# Patient Record
Sex: FEMALE | Race: WHITE
[De-identification: names, ages, dates, MRNs, and addresses within clinical notes are randomized per-mention and may not be internally consistent; named-entity substitution may affect disease eponyms.]

---

## 2018-09-26 ENCOUNTER — HOSPITAL ENCOUNTER (EMERGENCY)
Dept: HOSPITAL 33 - ED | Age: 28
Discharge: HOME | End: 2018-09-26
Payer: COMMERCIAL

## 2018-09-26 VITALS — DIASTOLIC BLOOD PRESSURE: 77 MMHG | SYSTOLIC BLOOD PRESSURE: 119 MMHG

## 2018-09-26 VITALS — OXYGEN SATURATION: 97 %

## 2018-09-26 VITALS — HEART RATE: 74 BPM

## 2018-09-26 DIAGNOSIS — M54.9: ICD-10-CM

## 2018-09-26 DIAGNOSIS — M54.2: ICD-10-CM

## 2018-09-26 DIAGNOSIS — R51: Primary | ICD-10-CM

## 2018-09-26 DIAGNOSIS — R53.1: ICD-10-CM

## 2018-09-26 DIAGNOSIS — Z79.899: ICD-10-CM

## 2018-09-26 LAB
ALBUMIN SERPL-MCNC: 5.1 G/DL (ref 3.5–5)
ALP SERPL-CCNC: 75 U/L (ref 38–126)
ALT SERPL-CCNC: 17 U/L (ref 0–35)
AMPHETAMINES UR QL: NEGATIVE
ANION GAP SERPL CALC-SCNC: 18.5 MEQ/L (ref 5–15)
AST SERPL QL: 22 U/L (ref 14–36)
BARBITURATES UR QL: NEGATIVE
BASOPHILS # BLD AUTO: 0.01 10*3/UL (ref 0–0.4)
BASOPHILS NFR BLD AUTO: 0.1 % (ref 0–0.4)
BENZODIAZ UR QL SCN: NEGATIVE
BILIRUB BLD-MCNC: 0.6 MG/DL (ref 0.2–1.3)
BUN SERPL-MCNC: 10 MG/DL (ref 7–17)
CALCIUM SPEC-MCNC: 9.8 MG/DL (ref 8.4–10.2)
CHLORIDE SERPL-SCNC: 100 MMOL/L (ref 98–107)
CO2 SERPL-SCNC: 26 MMOL/L (ref 22–30)
COCAINE UR QL SCN: NEGATIVE
CREAT SERPL-MCNC: 0.95 MG/DL (ref 0.52–1.04)
EOSINOPHIL # BLD AUTO: 0 10*3/UL (ref 0–0.5)
GLUCOSE SERPL-MCNC: 110 MG/DL (ref 74–106)
GLUCOSE UR-MCNC: NEGATIVE MG/DL
GRANULOCYTES # BLD AUTO: 11.13 10*3/UL (ref 1.4–6.9)
HCT VFR BLD AUTO: 46.1 % (ref 35–47)
HGB BLD-MCNC: 15.6 GM/DL (ref 12–16)
LYMPHOCYTES # SPEC AUTO: 0.79 10*3/UL (ref 1–4.6)
MCH RBC QN AUTO: 32.8 PG (ref 26–32)
MCHC RBC AUTO-ENTMCNC: 33.8 G/DL (ref 32–36)
METHADONE UR QL: NEGATIVE
MONOCYTES # BLD AUTO: 1.05 10*3/UL (ref 0–1.3)
NEUTROPHILS NFR BLD AUTO: 85.7 % (ref 36–66)
OPIATES UR QL: NEGATIVE
PCP UR QL CFM>20 NG/ML: NEGATIVE
PLATELET # BLD AUTO: 196 K/MM3 (ref 150–450)
POTASSIUM SERPLBLD-SCNC: 3.5 MMOL/L (ref 3.5–5.1)
PROT SERPL-MCNC: 8.6 G/DL (ref 6.3–8.2)
PROT UR STRIP-MCNC: NEGATIVE MG/DL
RBC # BLD AUTO: 4.76 M/MM3 (ref 4.1–5.4)
SODIUM SERPL-SCNC: 141 MMOL/L (ref 137–145)
THC UR QL SCN: NEGATIVE
WBC # BLD AUTO: 13 K/MM3 (ref 4–10.5)

## 2018-09-26 PROCEDURE — 85025 COMPLETE CBC W/AUTO DIFF WBC: CPT

## 2018-09-26 PROCEDURE — 96360 HYDRATION IV INFUSION INIT: CPT

## 2018-09-26 PROCEDURE — 36415 COLL VENOUS BLD VENIPUNCTURE: CPT

## 2018-09-26 PROCEDURE — 87040 BLOOD CULTURE FOR BACTERIA: CPT

## 2018-09-26 PROCEDURE — 87086 URINE CULTURE/COLONY COUNT: CPT

## 2018-09-26 PROCEDURE — 80053 COMPREHEN METABOLIC PANEL: CPT

## 2018-09-26 PROCEDURE — 70450 CT HEAD/BRAIN W/O DYE: CPT

## 2018-09-26 PROCEDURE — 36000 PLACE NEEDLE IN VEIN: CPT

## 2018-09-26 PROCEDURE — 87651 STREP A DNA AMP PROBE: CPT

## 2018-09-26 PROCEDURE — 96375 TX/PRO/DX INJ NEW DRUG ADDON: CPT

## 2018-09-26 PROCEDURE — 96374 THER/PROPH/DIAG INJ IV PUSH: CPT

## 2018-09-26 PROCEDURE — 80307 DRUG TEST PRSMV CHEM ANLYZR: CPT

## 2018-09-26 PROCEDURE — 99284 EMERGENCY DEPT VISIT MOD MDM: CPT

## 2018-09-26 PROCEDURE — 81003 URINALYSIS AUTO W/O SCOPE: CPT

## 2018-09-26 PROCEDURE — 84703 CHORIONIC GONADOTROPIN ASSAY: CPT

## 2018-09-26 NOTE — ERPHSYRPT
- History of Present Illness


Time Seen by Provider: 18 12:02


Source: patient


Exam Limitations: no limitations


Physician History: 





27-year-old white female with history of chronic back and neck pain history of 

headaches who has seen Dr. apple for neck pain and back pain and has had a MRI 

several months ago.  Also with a history of chronic right-sided weakness again 

which has been worked up by Dr. apple.


Arrives with complaint of a headache symptoms going on for several days she 

states she's had a fever she has no vomiting no photophobia


Patient states she had a fever at home





She presented to Dr. Julio's office was noted to have a temperature around 98


Sent here for further workup.





Past medical history includes chronic back and neck pain for several months 

weakness in the right side or several months


Headaches


Past surgical history includes .





Timing/Duration: day(s) (2-3 days)


Severity: moderate


Modifying Factors: Improves With: nothing


Associated Symptoms: fever, headaches, other (chronic right-sided weakness, 

chronic back and neck pain), No denies symptoms, No nausea, No vomiting, No 

abdominal pain, No shortness of breath, No heartburn, No diaphoresis, No cough, 

No chills, No chest pain, No loss of appetite, No malaise, No rash, No syncope, 

No seizure, No weakness


Allergies/Adverse Reactions: 








No Known Drug Allergies Allergy (Verified 18 11:52)


 





Home Medications: 








Gabapentin 800 mg PO TID 18 [History]


Meloxicam 15 mg [Meloxicam 15 MG] 15 mg PO UD 18 [History]


Sumatriptan Succinate [Imitrex] 100 mg PO UD 18 [History]


Tizanidine HCl 4 mg PO DAILY 18 [History]


Topiramate 50 mg PO BID 18 [History]





Hx Tetanus, Diphtheria Vaccination/Date Given: No


Hx Influenza Vaccination/Date Given: No


Hx Pneumococcal Vaccination/Date Given: No





- Review of Systems


Constitutional: Fever, No Chills, No Fatigue, No Lethargy, No Night Sweats, No 

Weakness, No Weight Loss


Eyes: No Symptoms


Ears, Nose, & Throat: No Symptoms


Respiratory: No Cough, No Dyspnea


Cardiac: No Chest Pain, No Edema, No Syncope


Abdominal/Gastrointestinal: No Abdominal Pain, No Nausea, No Vomiting, No 

Diarrhea


Genitourinary Symptoms: No Dysuria


Musculoskeletal: Back Pain (chronic back pain), Neck Pain (Chronic neck pain), 

No Deformity, No Fall, No Injury, No Joint Redness, No Joint Pain, No Joint 

Swelling, No Myalgias


Skin: No Rash


Neurological: Headache, Other (Chronic neck and back pain, chronic right upper 

extremity weakness 2-3 months)


Psychological: No Symptoms


Endocrine: No Symptoms


All Other Systems: Reviewed and Negative





- Past Medical History


Pertinent Past Medical History: No


Neurological History: Other (chronic right upper extremity weakness, headaches)


Musculoskeletal History: Other (chronic back and neck pain)





- Past Surgical History


Past Surgical History: Yes


Female Surgical History:  Section





- Social History


Smoking Status: Never smoker


Exposure to second hand smoke: No


Drug Use: none


Patient Lives Alone: No





- Nursing Vital Signs


Nursing Vital Signs: 


 Initial Vital Signs











Blood Pressure  116/82   18 11:40








 Pain Scale











Pain Intensity                 4

















- Physical Exam


General Appearance: no apparent distress, alert


Eye Exam: PERRL/EOMI, eyes nml inspection


Ears, Nose, Throat Exam: normal ENT inspection, TMs normal, pharynx normal, 

moist mucous membranes


Neck Exam: normal inspection, non-tender, other (patient states neck 

chronically painfull with movement)


Respiratory Exam: normal breath sounds, lungs clear, No respiratory distress


Cardiovascular Exam: regular rate/rhythm, normal heart sounds, normal 

peripheral pulses


Gastrointestinal/Abdomen Exam: soft, normal bowel sounds, No tenderness, No mass


Back Exam: normal inspection, normal range of motion, No CVA tenderness, No 

vertebral tenderness


Extremity Exam: normal inspection, normal range of motion, pelvis stable


Neurologic Exam: alert, oriented x 3, cooperative, CNs II-XII nml as tested ( 

we don't.  Here shortl), normal mood/affect, nml cerebellar function, nml 

station & gait, sensation nml, other (normal finger to nose and gait, 

questionable poor effort right ), No motor deficits


Skin Exam: normal color, warm, dry, No rash


**SpO2 Interpretation**: normal (97%), borderline oxygenation


SpO2: 97


Oxygen Delivery: Room Air





- Course


Nursing assessment & vital signs reviewed: Yes





- CT Exams


  ** Head


CT Interpretation: Discussed w/radiologist (CT head without contrast: Impression

: No acute intracranial abnormality is seen)


Ordered Tests: 


 Active Orders 24 hr











 Category Date Time Status


 


 IV Insertion STAT Care  18 12:01 Active


 


 HEAD WITHOUT CONTRAST [CT] Stat Exams  18 13:06 Completed


 


 BLOOD CULTURE Stat Lab  18 12:25 Received


 


 CBC W DIFF Stat Lab  18 12:15 Completed


 


 CMP Stat Lab  18 12:15 Completed


 


 CULTURE,URINE Routine Lab  18 12:30 Received


 


 HCG QUALITATIVE,SERUM Stat Lab  18 12:15 Completed


 


 UA W/RFX UR CULTURE Stat Lab  18 12:10 Completed


 


 Urine Triage Profile Stat Lab  18 12:10 Completed








Medication Summary














Discontinued Medications














Generic Name Dose Route Start Last Admin





  Trade Name Freq  PRN Reason Stop Dose Admin


 


Diphenhydramine HCl  25 mg  18 13:06  18 13:24





  Benadryl 50 Mg/Ml***  IV  18 13:07  25 mg





  STAT ONE   Administration





     





     





     





     


 


Diphenhydramine HCl  Confirm  18 13:22  





  Benadryl 50 Mg/Ml***  Administered  18 13:23  





  Dose   





  50 mg   





  .ROUTE   





  .STK-MED ONE   





     





     





     





     


 


Sodium Chloride  1,000 mls @ 999 mls/hr  18 12:01  18 12:23





  Sodium Chloride 0.9% 1000 Ml  IV  18 13:01  999 mls/hr





  .Q1H1M STA   Administration





     





     





     





     


 


Sodium Chloride  Confirm  18 12:22  





  Sodium Chloride 0.9% 1000 Ml  Administered  18 12:23  





  Dose   





  1,000 mls @ ud   





  .ROUTE   





  .STK-MED ONE   





     





     





     





     


 


Metoclopramide HCl  10 mg  18 13:06  18 13:24





  Reglan 10 Mg/2 Ml***  IV  18 13:07  10 mg





  STAT ONE   Administration





     





     





     





     


 


Metoclopramide HCl  Confirm  18 13:22  





  Reglan 10 Mg/2 Ml***  Administered  18 13:23  





  Dose   





  10 mg   





  .ROUTE   





  .STK-MED ONE   





     





     





     





     











Lab/Rad Data: 


 Laboratory Result Diagrams





 18 12:15 





 18 12:15 





 Laboratory Results











  18 Range/Units





  12:20 12:15 12:15 


 


WBC     (4.0-10.5)  K/mm3


 


RBC     (4.1-5.4)  M/mm3


 


Hgb     (12.0-16.0)  gm/dl


 


Hct     (35-47)  %


 


MCV     ()  fl


 


MCH     (26-32)  pg


 


MCHC     (32-36)  g/dl


 


RDW     (11.5-14.0)  %


 


Plt Count     (150-450)  K/mm3


 


MPV     (6-9.5)  fl


 


Gran %     (36.0-66.0)  %


 


Eos # (Auto)     (0-0.5)  


 


Absolute Lymphs (auto)     (1.0-4.6)  


 


Absolute Monos (auto)     (0.0-1.3)  


 


Lymphocytes %     (24.0-44.0)  %


 


Monocytes %     (0.0-12.0)  %


 


Eosinophils %     (0.00-5.0)  %


 


Basophils %     (0.0-0.4)  %


 


Absolute Granulocytes     (1.4-6.9)  


 


Basophils #     (0-0.4)  


 


Sodium    141  (137-145)  mmol/L


 


Potassium    3.5  (3.5-5.1)  mmol/L


 


Chloride    100  ()  mmol/L


 


Carbon Dioxide    26  (22-30)  mmol/L


 


Anion Gap    18.5 H  (5-15)  MEQ/L


 


BUN    10  (7-17)  mg/dL


 


Creatinine    0.95  (0.52-1.04)  mg/dL


 


Estimated GFR    > 60.0  ML/MIN


 


Glucose    110 H  ()  mg/dL


 


Calcium    9.8  (8.4-10.2)  mg/dL


 


Total Bilirubin    0.60  (0.2-1.3)  mg/dL


 


AST    22  (14-36)  U/L


 


ALT    17  (0-35)  U/L


 


Alkaline Phosphatase    75  ()  U/L


 


Serum Total Protein    8.6 H  (6.3-8.2)  g/dL


 


Albumin    5.1 H  (3.5-5.0)  g/dL


 


Serum Pregnancy, Qual   NEGATIVE   (Negative)  


 


Ur Collection Type     


 


Urine Color     (YELLOW)  


 


Urine Appearance     (CLEAR)  


 


Urine pH     (5-6)  


 


Ur Specific Gravity     (1.005-1.025)  


 


Urine Protein     (Negative)  


 


Urine Ketones     (NEGATIVE)  


 


Urine Blood     (0-5)  Hossein/ul


 


Urine Nitrite     (NEGATIVE)  


 


Urine Bilirubin     (NEGATIVE)  


 


Urine Urobilinogen     (0-1)  mg/dL


 


Ur Leukocyte Esterase     (NEGATIVE)  


 


Urine WBC (Auto)     (0-5)  /HPF


 


U Epithel Cells (Auto)     (FEW)  /HPF


 


Urine Bacteria (Auto)     (NEGATIVE)  /HPF


 


Urine Mucus (Auto)     (NEGATIVE)  /HPF


 


Urine Culture Reflexed     (NO)  


 


Urine Glucose     (NEGATIVE)  mg/dL


 


Urine Opiates Level     (NEGATIVE)  


 


Ur Methadone     (NEGATIVE)  


 


Urine Barbiturates     (NEGATIVE)  


 


Ur Phencyclidine (PCP)     (NEGATIVE)  


 


Urine Amphetamine     (NEGATIVE)  


 


U Benzodiazepine Level     (NEGATIVE)  


 


Urine Cocaine     (NEGATIVE)  


 


Urine Marijuana (THC)     (NEGATIVE)  


 


Group A Strep Antibody  NEGATIVE    (NEGATIVE)  














  18 Range/Units





  12:15 12:10 12:10 


 


WBC  13.0 H    (4.0-10.5)  K/mm3


 


RBC  4.76    (4.1-5.4)  M/mm3


 


Hgb  15.6    (12.0-16.0)  gm/dl


 


Hct  46.1    (35-47)  %


 


MCV  96.8    ()  fl


 


MCH  32.8 H    (26-32)  pg


 


MCHC  33.8    (32-36)  g/dl


 


RDW  12.4    (11.5-14.0)  %


 


Plt Count  196    (150-450)  K/mm3


 


MPV  11.3 H    (6-9.5)  fl


 


Gran %  85.7 H    (36.0-66.0)  %


 


Eos # (Auto)  0    (0-0.5)  


 


Absolute Lymphs (auto)  0.79 L    (1.0-4.6)  


 


Absolute Monos (auto)  1.05    (0.0-1.3)  


 


Lymphocytes %  6.1 L    (24.0-44.0)  %


 


Monocytes %  8.1    (0.0-12.0)  %


 


Eosinophils %  0.0    (0.00-5.0)  %


 


Basophils %  0.1    (0.0-0.4)  %


 


Absolute Granulocytes  11.13 H    (1.4-6.9)  


 


Basophils #  0.01    (0-0.4)  


 


Sodium     (137-145)  mmol/L


 


Potassium     (3.5-5.1)  mmol/L


 


Chloride     ()  mmol/L


 


Carbon Dioxide     (22-30)  mmol/L


 


Anion Gap     (5-15)  MEQ/L


 


BUN     (7-17)  mg/dL


 


Creatinine     (0.52-1.04)  mg/dL


 


Estimated GFR     ML/MIN


 


Glucose     ()  mg/dL


 


Calcium     (8.4-10.2)  mg/dL


 


Total Bilirubin     (0.2-1.3)  mg/dL


 


AST     (14-36)  U/L


 


ALT     (0-35)  U/L


 


Alkaline Phosphatase     ()  U/L


 


Serum Total Protein     (6.3-8.2)  g/dL


 


Albumin     (3.5-5.0)  g/dL


 


Serum Pregnancy, Qual     (Negative)  


 


Ur Collection Type    CLEAN CATCH  


 


Urine Color    STRAW  (YELLOW)  


 


Urine Appearance    CLOUDY  (CLEAR)  


 


Urine pH    7.0  (5-6)  


 


Ur Specific Gravity    1.002  (1.005-1.025)  


 


Urine Protein    NEGATIVE  (Negative)  


 


Urine Ketones    NEGATIVE  (NEGATIVE)  


 


Urine Blood    SMALL  (0-5)  Hossein/ul


 


Urine Nitrite    NEGATIVE  (NEGATIVE)  


 


Urine Bilirubin    NEGATIVE  (NEGATIVE)  


 


Urine Urobilinogen    2  (0-1)  mg/dL


 


Ur Leukocyte Esterase    NEGATIVE  (NEGATIVE)  


 


Urine WBC (Auto)    0-2  (0-5)  /HPF


 


U Epithel Cells (Auto)    MODERATE  (FEW)  /HPF


 


Urine Bacteria (Auto)    MANY  (NEGATIVE)  /HPF


 


Urine Mucus (Auto)    SLIGHT  (NEGATIVE)  /HPF


 


Urine Culture Reflexed    NO  (NO)  


 


Urine Glucose    NEGATIVE  (NEGATIVE)  mg/dL


 


Urine Opiates Level   NEGATIVE   (NEGATIVE)  


 


Ur Methadone   NEGATIVE   (NEGATIVE)  


 


Urine Barbiturates   NEGATIVE   (NEGATIVE)  


 


Ur Phencyclidine (PCP)   NEGATIVE   (NEGATIVE)  


 


Urine Amphetamine   NEGATIVE   (NEGATIVE)  


 


U Benzodiazepine Level   NEGATIVE   (NEGATIVE)  


 


Urine Cocaine   NEGATIVE   (NEGATIVE)  


 


Urine Marijuana (THC)   NEGATIVE   (NEGATIVE)  


 


Group A Strep Antibody     (NEGATIVE)  














- Progress


Progress: improved


Progress Note: 





18 13:07


27-year-old white female with history of chronic neck and back pain history of 

migraines


Complaining of a headache for 3 days she states she's had a fever at home no 

fever here in the emergency room appears to be stable.





Patient with very poor  right side which questionably is secondary to 

effort.


Patient does have chronic weak  as well she has been seeing Dr. Reed for 

headaches neck pain back pain and her right  weakness.





She was seen at Dr. Mistry's office she stated that she had a high fever at home 

however she has been afebrile here temperature is 99.1 vitals are stable.





CBC CMP hCG UA have been ordered on this patient.





She states she isn't sure if she is better after receiving IV fluids Will add 

Reglan 10 mg and Benadryl 25 mg IV.


Will obtain head CT on this patient.





18 14:48


Patient's head CT within normal limits no acute changes





Patient's chemistry sodium 141 potassium 3.5 chloride 100 bicarbonate 26 BUN 10 

creatinine 0.95 glucose 110





HCG is negative





CBC white count mildly elevated 13.0 hemoglobin 15.6 hematocrit 46.1 platelets 

196





Urinalysis specific gravity 1.002 pH 7.0.





Urine drug screen is negative.





Patient has been stable since arrival she is afebrile she states she is feeling 

better after Reglan and Benadryl.





I've discussed the patient's case with Dr. mistry





Will plan to release patient home she is to take her medications as prescribed 

by Dr. apple/Dr. Mistry.  She is to contact Dr. Mistry office to arrange follow-up.











- Departure


Time of Disposition: 15:20


Departure Disposition: Home


Clinical Impression: 


 History of migraine, history of chronic rightue weakness, Neck pain, history 

of chronic back / neck pain, Fever at home





Headache


Qualifiers:


 Headache type: unspecified Headache chronicity pattern: acute headache 

Intractability: not intractable Qualified Code(s): R51 - Headache





Condition: Fair


Critical Care Time: No


Referrals: 


SHANIA MISTRY [Primary Care Provider] - 


Additional Instructions: 


Return home.


Plenty of fluids.


Continue medications as prescribed by Dr. pain/your family doctor (Dr. Mistry)


Follow-up with Dr. Mistry called and arrange follow-up appointment.


Return for acute distress or for severe symptoms.

## 2018-09-26 NOTE — XRAY
Exam: CT of the head without IV contrast from 9/26/2018.               CTDI:

70.91.



Comparison: None.



Indication: 27-year-old female with headache for 2 months, no known injury.



Technique: Non-IV contrast axial images were obtained through the brain.

Reconstructed coronal and sagittal images were created and reviewed.  The

axial images were repeated because of motion artifact on the initial CT run.



Findings: The ventricles appear of normal size and shape.  No focal mass

effect or midline shift is seen.  No acute intracranial bleed or abnormal

extra-axial fluid collection is seen.  The gray matter-white matter interfaces

appear well-maintained.  No low attenuation territorial infarct is seen.  The

cortical sulci and basilar cisterns appear unremarkable.



The globes of each eye and retrobulbar regions appear unremarkable.  The

visualized paranasal sinuses and mastoid air cells appear clear.  The

calvarium of the skull is intact.



Impression:



1.  No acute intracranial abnormality is seen.

## 2025-02-25 ENCOUNTER — HOSPITAL ENCOUNTER (EMERGENCY)
Dept: HOSPITAL 33 - ED | Age: 35
Discharge: HOME | End: 2025-02-25
Payer: COMMERCIAL

## 2025-02-25 VITALS — DIASTOLIC BLOOD PRESSURE: 71 MMHG | RESPIRATION RATE: 20 BRPM | SYSTOLIC BLOOD PRESSURE: 123 MMHG | HEART RATE: 64 BPM

## 2025-02-25 VITALS — OXYGEN SATURATION: 96 %

## 2025-02-25 VITALS — TEMPERATURE: 98.3 F

## 2025-02-25 DIAGNOSIS — R07.9: Primary | ICD-10-CM

## 2025-02-25 LAB
ALBUMIN SERPL-MCNC: 4.8 G/DL (ref 3.5–5)
ALP SERPL-CCNC: 54 U/L (ref 38–126)
ALT SERPL-CCNC: 23 U/L (ref 0–35)
ANION GAP SERPL CALC-SCNC: 15 MEQ/L (ref 5–15)
AST SERPL QL: 24 U/L (ref 14–36)
BASOPHILS # BLD AUTO: 0.01 X10^3/UL (ref 0.01–0.08)
BASOPHILS NFR BLD AUTO: 0.1 % (ref 0.1–1.2)
BILIRUB BLD-MCNC: 0.7 MG/DL (ref 0.2–1.3)
BUN SERPL-MCNC: 10 MG/DL (ref 7–17)
CALCIUM SPEC-MCNC: 10 MG/DL (ref 8.4–10.2)
CHLORIDE SERPL-SCNC: 102 MMOL/L (ref 98–107)
CO2 SERPL-SCNC: 28 MMOL/L (ref 22–30)
CREAT SERPL-MCNC: 0.83 MG/DL (ref 0.52–1.04)
EOSINOPHIL # BLD AUTO: 0.04 X10^3/UL (ref 0.04–0.36)
GFR SERPLBLD BASED ON 1.73 SQ M-ARVRAT: 93.6 ML/MIN
GLUCOSE SERPL-MCNC: 121 MG/DL (ref 74–106)
HCT VFR BLD AUTO: 38.7 % (ref 34.1–44.9)
HGB BLD-MCNC: 13.4 G/DL (ref 11.2–15.7)
IMM GRANULOCYTES # BLD: 0.02 X10^3U/L (ref 0–0.03)
IMM GRANULOCYTES NFR BLD: 0.2 % (ref 0–0.43)
LYMPHOCYTES # SPEC AUTO: 1.88 X10^3/UL (ref 1.18–3.74)
MCH RBC QN AUTO: 32.6 PG (ref 25.6–32.2)
MCHC RBC AUTO-ENTMCNC: 34.6 G/DL (ref 32.2–35.5)
MONOCYTES # BLD AUTO: 0.54 X10^3/UL (ref 0.24–0.86)
NRBC # BLD AUTO: 0 X10^3U/L (ref 0–0.01)
NRBC BLD AUTO-RTO: 0 % (ref 0–0.2)
NT-PROBNP SERPL-MCNC: 37 PG/ML (ref ?–300)
PLATELET # BLD AUTO: 224 X10^3/UL (ref 182–369)
POTASSIUM SERPLBLD-SCNC: 3.8 MMOL/L (ref 3.5–5.1)
PROT SERPL-MCNC: 7.4 G/DL (ref 6.3–8.2)
RBC # BLD AUTO: 4.11 X10^6/UL (ref 3.93–5.22)
SODIUM SERPL-SCNC: 142 MMOL/L (ref 135–145)
TROPONIN T SERPL HS-MCNC: < 0.012 NG/ML (ref 0–0.03)
WBC # BLD AUTO: 9 X10^3/UL (ref 3.98–10.04)

## 2025-02-25 PROCEDURE — 71045 X-RAY EXAM CHEST 1 VIEW: CPT

## 2025-02-25 PROCEDURE — 85025 COMPLETE CBC W/AUTO DIFF WBC: CPT

## 2025-02-25 PROCEDURE — 36415 COLL VENOUS BLD VENIPUNCTURE: CPT

## 2025-02-25 PROCEDURE — 93005 ELECTROCARDIOGRAM TRACING: CPT

## 2025-02-25 PROCEDURE — 83880 ASSAY OF NATRIURETIC PEPTIDE: CPT

## 2025-02-25 PROCEDURE — 94760 N-INVAS EAR/PLS OXIMETRY 1: CPT

## 2025-02-25 PROCEDURE — 99284 EMERGENCY DEPT VISIT MOD MDM: CPT

## 2025-02-25 PROCEDURE — 85379 FIBRIN DEGRADATION QUANT: CPT

## 2025-02-25 PROCEDURE — 84484 ASSAY OF TROPONIN QUANT: CPT

## 2025-02-25 PROCEDURE — 93041 RHYTHM ECG TRACING: CPT

## 2025-02-25 PROCEDURE — 80053 COMPREHEN METABOLIC PANEL: CPT

## 2025-02-25 PROCEDURE — 99285 EMERGENCY DEPT VISIT HI MDM: CPT

## 2025-02-25 NOTE — ERPHSYRPT
- History of Present Illness


Time Seen by Provider: 25 15:40


Source: patient


Exam Limitations: no limitations


Patient Subjective Stated Complaint: Pt states "I went to Dr. Amador, my 

family , because I have been having chest pain for the past month but the last

week it has been constant and is like pressure.  It feels like I got punched in 

the chest."


Triage Nursing Assessment: Pt presented alert and oriented X 3, skin pwd. Pt 

ambulates with an upright steady gait, able to speak in clear full sentences. Pt

resting comfortably on the bed.


Physician History: 


34-year-old female presents to our ED as a referral from her primary care 

doctor's office for evaluation of intermittent chest pain x 1 month.  Patient 

states the pain has been constant for the past week.  Pain described as a 

pressure primarily in the epigastric region.  Sometimes it feels as though she 

got punched in the chest.  Symptoms are localized.  No radiation.  No associated

nausea vomiting or diaphoresis.  No trauma no fever.  No active chest pain at 

this time.  Patient otherwise feels well.  She voices no other complaints or 

concerns at this time.








Portions of this note were created with voice recognition technology.  There may

be grammatical, spelling, punctuation or sound alike errors





Timing/Duration: week(s) (1 month)


Severity: moderate


Modifying Factors: Improves With: nothing


Associated Symptoms: denies symptoms


Allergies/Adverse Reactions: 








No Known Drug Allergies Allergy (Verified 18 11:52)


   





Home Medications: 








No Reportable Medications [No Reported Medications]  25 [History]





Hx Tetanus, Diphtheria Vaccination/Date Given: No


Hx Influenza Vaccination/Date Given: No


Hx Pneumococcal Vaccination/Date Given: No


Immunizations Up to Date: No





Travel Risk





- International Travel


Have you traveled outside of the country in past 3 weeks: No





- Emerging Infectious Disease


Are you exhibiting symptoms associated with any current EIDs: No





- Review of Systems


Constitutional: No Symptoms, No Fever, No Chills


Eyes: No Symptoms


Ears, Nose, & Throat: No Symptoms


Respiratory: No Symptoms, No Cough, No Dyspnea


Cardiac: No Symptoms, No Chest Pain, No Edema, No Syncope


Abdominal/Gastrointestinal: No Symptoms, No Abdominal Pain, No Nausea, No 

Vomiting, No Diarrhea


Genitourinary Symptoms: No Symptoms, No Dysuria


Musculoskeletal: No Symptoms, No Back Pain, No Neck Pain


Skin: No Symptoms, No Rash


Neurological: No Symptoms, No Dizziness, No Focal Weakness, No Sensory Changes


Psychological: No Symptoms


Endocrine: No Symptoms


Hematologic/Lymphatic: No Symptoms


Immunological/Allergic: No Symptoms


All Other Systems: Reviewed and Negative





- Past Medical History


Pertinent Past Medical History: No


Neurological History: No Pertinent History


Cardiac History: No Pertinent History


Respiratory History: No Pertinent History


Endocrine Medical History: No Pertinent History


Musculoskeletal History: Degenerative Disk Disease, Other


Psycho-Social History: Depression


Other Medical History: XRAYS SHOW SCOLIOSIS RIGHT WITH APEX T12, MILD DISC 

DEGENERATION L5-S1 WITH ANTEROLISTHESIS L5 ON S1 5-6 MM.  UTERINE IUD, SEASONAL 

ALLERGIES, CARPAL TUNNEL RELEASE RIGHT





- Past Surgical History


Past Surgical History: Yes


Female Surgical History:  Section





- Female History


Hx Last Menstrual Period: years, IUD


Hx Pregnant Now: No





- Social History


Smoking Status: Never smoker


Exposure to second hand smoke: No


Drug Use: none





- Social Determinants of Health


Will the patient participate in the screening: Yes


Do you worry about a steady place to live?: No


Do you have any problems with any of the following?: No known problems


In the past 12 months,have you had to go without utilities?: No


Transportation Issues: Yes


Has anyone in your support network made you feel unsafe?: No


Have you or anyone in your house had to go w/o enough food: No





- Nursing Vital Signs


Nursing Vital Signs: 


                               Initial Vital Signs











Temperature  98.3 F   25 15:29


 


Pulse Rate  74   25 15:29


 


Respiratory Rate  18   25 15:29


 


Blood Pressure  124/86   25 15:29


 


O2 Sat by Pulse Oximetry  96   25 15:29








                                   Pain Scale











Pain Intensity                 0

















- Physical Exam


General Appearance: no apparent distress, alert


Eye Exam: PERRL/EOMI, eyes nml inspection


Ears, Nose, Throat Exam: normal ENT inspection, pharynx normal, moist mucous 

membranes


Neck Exam: normal inspection, non-tender, supple, full range of motion


Respiratory Exam: normal breath sounds, lungs clear, airway intact, No 

respiratory distress


Cardiovascular Exam: regular rate/rhythm, normal heart sounds, normal peripheral

 pulses


Gastrointestinal/Abdomen Exam: soft, normal bowel sounds, No tenderness, No mass


Back Exam: normal inspection, normal range of motion, No CVA tenderness, No 

vertebral tenderness


Extremity Exam: normal inspection, normal range of motion, pelvis stable


Neurologic Exam: alert, oriented x 3, cooperative, normal mood/affect, nml 

cerebellar function, nml station & gait, sensation nml, No motor deficits


Skin Exam: normal color, warm, dry, No rash


Lymphatic Exam: No adenopathy


**SpO2 Interpretation**: normal


SpO2: 96


O2 Delivery: Room Air





- Course


Nursing assessment & vital signs reviewed: Yes


EKG Interpreted by Me: RATE ([id number]), Sinus Rhythm, NORMAL AXIS, NORMAL INTERVALS, 

NORMAL QRS





- Radiology Exams


  ** Chest


X-ray Interpretation: Interpreted by me (No acute findings)


Ordered Tests: 


                               Active Orders 24 hr











 Category Date Time Status


 


 Cardiac Monitor STAT Care  25 15:54 Active


 


 EKG-ER Only STAT Care  25 15:53 Active


 


 IV Insertion STAT Care  25 15:53 Active


 


 Pulse Oximetry (ED) STAT Care  25 15:53 Active


 


 CHEST 1 VIEW (PORTABLE) Stat Exams  25 17:05 Taken


 


 CBC W DIFF Stat Lab  25 15:52 Completed


 


 CMP Stat Lab  25 15:52 Completed


 


 D-DIMER QUANTITATIVE Stat Lab  25 15:52 Completed


 


 HCG QUALITATIVE, URINE Stat Lab  25 Ordered


 


 NT PRO BNPII Stat Lab  25 15:52 Completed


 


 TROPONIN Q4H Lab  25 15:52 Completed


 


 TROPONIN Q4H Lab  25 18:57 Completed


 


 TROPONIN Q4H Lab  25 00:00 Ordered











Lab/Rad Data: 


                           Laboratory Result Diagrams





                                 25 15:52 





                                 25 15:52 





                               Laboratory Results











  25 Range/Units





  18:57 15:52 15:52 


 


WBC     (3.98-10.04)  x10^3/uL


 


RBC     (3.93-5.22)  x10^6/uL


 


Hgb     (11.2-15.7)  g/dL


 


Hct     (34.1-44.9)  %


 


MCV     (79.4-94.8)  fL


 


MCH     (25.6-32.2)  pg


 


MCHC     (32.2-35.5)  g/dL


 


RDW     (11.7-14.4)  %


 


Plt Count     (182-369)  x10^3/uL


 


MPV     (9.4-12.3)  fL


 


Gran %     (34.0-71.1)  %


 


Immature Gran % (Auto)     (0.001-0.429)  %


 


Nucleat RBC Rel Count     (0.00-0.2)  %


 


Eos # (Auto)     (0.04-0.36)  x10^3/uL


 


Immature Gran # (Auto)     (0.001-0.031)  x10^3u/L


 


Absolute Lymphs (auto)     (1.18-3.74)  x10^3/uL


 


Absolute Monos (auto)     (0.24-0.86)  x10^3/uL


 


Absolute Nucleated RBC     (0.00-0.012)  x10^3u/L


 


Lymphocytes %     (19.3-51.7)  %


 


Monocytes %     (4.7-12.5)  %


 


Eosinophils %     (0.7-5.8)  %


 


Basophils %     (0.1-1.2)  %


 


Absolute Granulocytes     (1.56-6.13)  x10^3/uL


 


Basophils #     (0.01-0.08)  x10^3/uL


 


D-Dimer    0.29  (0.0-0.50)  mg/L


 


Sodium     (135-145)  mmol/L


 


Potassium     (3.5-5.1)  mmol/L


 


Chloride     ()  mmol/L


 


Carbon Dioxide     (22-30)  mmol/L


 


Anion Gap     (5-15)  MEQ/L


 


BUN     (7-17)  mg/dL


 


Creatinine     (0.52-1.04)  mg/dL


 


Estimated GFR     ML/MIN


 


Glucose     ()  mg/dL


 


Calcium     (8.4-10.2)  mg/dL


 


Total Bilirubin     (0.2-1.3)  mg/dL


 


AST     (14-36)  U/L


 


ALT     (0-35)  U/L


 


Alkaline Phosphatase     ()  U/L


 


Troponin I  < 0.012  < 0.012   (0.000-0.033)  ng/mL


 


NT-Pro-B Natriuret Pep   37.0   (<300)  pg/mL


 


Serum Total Protein     (6.3-8.2)  g/dL


 


Albumin     (3.5-5.0)  g/dL














  25 Range/Units





  15:52 15:52 


 


WBC   9.0  (3.98-10.04)  x10^3/uL


 


RBC   4.11  (3.93-5.22)  x10^6/uL


 


Hgb   13.4  (11.2-15.7)  g/dL


 


Hct   38.7  (34.1-44.9)  %


 


MCV   94.2  (79.4-94.8)  fL


 


MCH   32.6 H  (25.6-32.2)  pg


 


MCHC   34.6  (32.2-35.5)  g/dL


 


RDW   12.0  (11.7-14.4)  %


 


Plt Count   224  (182-369)  x10^3/uL


 


MPV   11.0  (9.4-12.3)  fL


 


Gran %   72.4 H  (34.0-71.1)  %


 


Immature Gran % (Auto)   0.2  (0.001-0.429)  %


 


Nucleat RBC Rel Count   0.0  (0.00-0.2)  %


 


Eos # (Auto)   0.04  (0.04-0.36)  x10^3/uL


 


Immature Gran # (Auto)   0.02  (0.001-0.031)  x10^3u/L


 


Absolute Lymphs (auto)   1.88  (1.18-3.74)  x10^3/uL


 


Absolute Monos (auto)   0.54  (0.24-0.86)  x10^3/uL


 


Absolute Nucleated RBC   0.00  (0.00-0.012)  x10^3u/L


 


Lymphocytes %   20.9  (19.3-51.7)  %


 


Monocytes %   6.0  (4.7-12.5)  %


 


Eosinophils %   0.4 L  (0.7-5.8)  %


 


Basophils %   0.1  (0.1-1.2)  %


 


Absolute Granulocytes   6.52 H  (1.56-6.13)  x10^3/uL


 


Basophils #   0.01  (0.01-0.08)  x10^3/uL


 


D-Dimer    (0.0-0.50)  mg/L


 


Sodium  142   (135-145)  mmol/L


 


Potassium  3.8   (3.5-5.1)  mmol/L


 


Chloride  102   ()  mmol/L


 


Carbon Dioxide  28   (22-30)  mmol/L


 


Anion Gap  15.0   (5-15)  MEQ/L


 


BUN  10   (7-17)  mg/dL


 


Creatinine  0.83   (0.52-1.04)  mg/dL


 


Estimated GFR  93.6   ML/MIN


 


Glucose  121 H   ()  mg/dL


 


Calcium  10.0   (8.4-10.2)  mg/dL


 


Total Bilirubin  0.70   (0.2-1.3)  mg/dL


 


AST  24   (14-36)  U/L


 


ALT  23   (0-35)  U/L


 


Alkaline Phosphatase  54   ()  U/L


 


Troponin I    (0.000-0.033)  ng/mL


 


NT-Pro-B Natriuret Pep    (<300)  pg/mL


 


Serum Total Protein  7.4   (6.3-8.2)  g/dL


 


Albumin  4.8   (3.5-5.0)  g/dL














- Progress


Progress: improved


Progress Note: 


Patient's heart score is a 1.  Patient reassessed.  No active chest pain.  D-

dimer negative.  Chest x-ray negative for acute pathology.  Troponin negative x 

2.  Vital stable.  EKG normal sinus rhythm.  No indication for further workup 

will discharge home.  Patient agrees to follow-up with her primary care doctor 

within 48 hours for reevaluation.





Portions of this note were created with voice recognition technology.  There may

 be grammatical, spelling, punctuation or sound alike errors








Complexity of problem addressed is moderate acute complicated no critical care 

time.  Complex of data reviewed analyzes moderate.  Test ordered chest reviewed 

results analyzed and correlated clinically with history and physical exam.  Risk

 of complication and or risk of morbidity/mortality of patient management is 

low.  Vital stable.  Time spent to discharge patient approximately 15 minutes.  

Plan of care established for shared decision making.  No social determinants of 

health present to impede follow-up.











Portions of this note were created with voice recognition technology.  There may

 be grammatical, spelling, punctuation or sound alike errors


25 20:03


Counseled pt/family regarding: lab results, diagnosis, need for follow-up, rad 

results





- Departure


Departure Disposition: Home


Clinical Impression: 


 Chest pain





Condition: Stable


Critical Care Time: No


Referrals: 


WILDER GRAVES [Primary Care Provider] - Follow up/PCP as directed


Additional Instructions: 


Discharge/Care Plan





VIKIBETITO ARIZMENDI was seen on 25 in the Emergency Room. The patient was 

counseled regarding Diagnosis,Lab results, Imaging studies, need for follow up 

and when to return to the Emergency Room.





Prescriptions given:





Discharge Note





I have spoken with the patient and/or caregivers. I have explained the patient's

condition, diagnosis and treatment plan based on the information available to me

at this time. I have answered the patient's and/or caregiver's questions and 

addressed any concerns. The patient and/or caregivers have as good understanding

of the patient's diagnosis, condition and treatment plan as can be expected at 

this point. The vital signs have been stable. The patient's condition is stable 

and appropriate for discharge from the emergency department.





The patient will pursue further outpatient evaluation with the primary care 

physician or other designated or consulting physician as outlined in the 

discharge instructions. The patient and/or caregivers are agreeable to this plan

of care and follow-up instructions have been explained in detail. The patient 

and/or caregivers have received these instruction. The patient/and or caregivers

are aware that any significant change in condition or worsening of symptoms 

should prompt an immediate return to this or the closest emergency department or

call 911.

## 2025-02-26 NOTE — XRAY
Indication: Pain.



Comparison: November 7, 2019



Portable chest less inflated accentuating cardiopulmonary structures.  No

focal infiltrate, consolidation, or large effusion.  Heart not enlarged.  Bony

thorax intact again with mild scoliosis.



Impression: Continued nonacute chest.